# Patient Record
Sex: FEMALE | Race: OTHER | HISPANIC OR LATINO | Employment: UNEMPLOYED | URBAN - METROPOLITAN AREA
[De-identification: names, ages, dates, MRNs, and addresses within clinical notes are randomized per-mention and may not be internally consistent; named-entity substitution may affect disease eponyms.]

---

## 2017-05-08 ENCOUNTER — APPOINTMENT (OUTPATIENT)
Dept: LAB | Facility: CLINIC | Age: 37
End: 2017-05-08
Payer: COMMERCIAL

## 2017-05-08 ENCOUNTER — TRANSCRIBE ORDERS (OUTPATIENT)
Dept: LAB | Facility: CLINIC | Age: 37
End: 2017-05-08

## 2017-05-08 DIAGNOSIS — E78.2 MIXED HYPERLIPIDEMIA: ICD-10-CM

## 2017-05-08 DIAGNOSIS — E55.9 UNSPECIFIED VITAMIN D DEFICIENCY: Primary | ICD-10-CM

## 2017-05-08 DIAGNOSIS — E55.9 UNSPECIFIED VITAMIN D DEFICIENCY: ICD-10-CM

## 2017-05-08 LAB
25(OH)D3 SERPL-MCNC: 26.7 NG/ML (ref 30–100)
ALBUMIN SERPL BCP-MCNC: 3.7 G/DL (ref 3.5–5)
ALP SERPL-CCNC: 93 U/L (ref 46–116)
ALT SERPL W P-5'-P-CCNC: 24 U/L (ref 12–78)
ANION GAP SERPL CALCULATED.3IONS-SCNC: 6 MMOL/L (ref 4–13)
AST SERPL W P-5'-P-CCNC: 15 U/L (ref 5–45)
BILIRUB SERPL-MCNC: 0.4 MG/DL (ref 0.2–1)
BUN SERPL-MCNC: 11 MG/DL (ref 5–25)
CALCIUM SERPL-MCNC: 9 MG/DL (ref 8.3–10.1)
CHLORIDE SERPL-SCNC: 103 MMOL/L (ref 100–108)
CHOLEST SERPL-MCNC: 226 MG/DL (ref 50–200)
CO2 SERPL-SCNC: 28 MMOL/L (ref 21–32)
CREAT SERPL-MCNC: 0.76 MG/DL (ref 0.6–1.3)
GFR SERPL CREATININE-BSD FRML MDRD: >60 ML/MIN/1.73SQ M
GLUCOSE P FAST SERPL-MCNC: 83 MG/DL (ref 65–99)
HDLC SERPL-MCNC: 53 MG/DL (ref 40–60)
LDLC SERPL CALC-MCNC: 156 MG/DL (ref 0–100)
POTASSIUM SERPL-SCNC: 4 MMOL/L (ref 3.5–5.3)
PROT SERPL-MCNC: 7.5 G/DL (ref 6.4–8.2)
SODIUM SERPL-SCNC: 137 MMOL/L (ref 136–145)
TRIGL SERPL-MCNC: 83 MG/DL

## 2017-05-08 PROCEDURE — 80061 LIPID PANEL: CPT

## 2017-05-08 PROCEDURE — 82306 VITAMIN D 25 HYDROXY: CPT

## 2017-05-08 PROCEDURE — 36415 COLL VENOUS BLD VENIPUNCTURE: CPT

## 2017-05-08 PROCEDURE — 80053 COMPREHEN METABOLIC PANEL: CPT

## 2017-07-31 ENCOUNTER — TRANSCRIBE ORDERS (OUTPATIENT)
Dept: LAB | Facility: CLINIC | Age: 37
End: 2017-07-31

## 2017-07-31 ENCOUNTER — APPOINTMENT (OUTPATIENT)
Dept: LAB | Facility: CLINIC | Age: 37
End: 2017-07-31
Payer: COMMERCIAL

## 2017-07-31 DIAGNOSIS — K90.2 POSTOPERATIVE BLIND LOOP SYNDROME: Primary | ICD-10-CM

## 2017-07-31 DIAGNOSIS — K90.2 POSTOPERATIVE BLIND LOOP SYNDROME: ICD-10-CM

## 2017-07-31 LAB
25(OH)D3 SERPL-MCNC: 23.2 NG/ML (ref 30–100)
ALBUMIN SERPL BCP-MCNC: 3.7 G/DL (ref 3.5–5)
ALP SERPL-CCNC: 82 U/L (ref 46–116)
ALT SERPL W P-5'-P-CCNC: 25 U/L (ref 12–78)
ANION GAP SERPL CALCULATED.3IONS-SCNC: 9 MMOL/L (ref 4–13)
AST SERPL W P-5'-P-CCNC: 15 U/L (ref 5–45)
BASOPHILS # BLD AUTO: 0.03 THOUSANDS/ΜL (ref 0–0.1)
BASOPHILS NFR BLD AUTO: 1 % (ref 0–1)
BILIRUB SERPL-MCNC: 0.5 MG/DL (ref 0.2–1)
BUN SERPL-MCNC: 14 MG/DL (ref 5–25)
CALCIUM SERPL-MCNC: 8.9 MG/DL (ref 8.3–10.1)
CHLORIDE SERPL-SCNC: 103 MMOL/L (ref 100–108)
CO2 SERPL-SCNC: 26 MMOL/L (ref 21–32)
CREAT SERPL-MCNC: 0.7 MG/DL (ref 0.6–1.3)
EOSINOPHIL # BLD AUTO: 0.04 THOUSAND/ΜL (ref 0–0.61)
EOSINOPHIL NFR BLD AUTO: 1 % (ref 0–6)
ERYTHROCYTE [DISTWIDTH] IN BLOOD BY AUTOMATED COUNT: 13.2 % (ref 11.6–15.1)
FOLATE SERPL-MCNC: 9.2 NG/ML (ref 3.1–17.5)
GFR SERPL CREATININE-BSD FRML MDRD: 112 ML/MIN/1.73SQ M
GLUCOSE P FAST SERPL-MCNC: 81 MG/DL (ref 65–99)
HCT VFR BLD AUTO: 41 % (ref 34.8–46.1)
HGB BLD-MCNC: 13.7 G/DL (ref 11.5–15.4)
IRON SERPL-MCNC: 140 UG/DL (ref 50–170)
LYMPHOCYTES # BLD AUTO: 1.65 THOUSANDS/ΜL (ref 0.6–4.47)
LYMPHOCYTES NFR BLD AUTO: 31 % (ref 14–44)
MCH RBC QN AUTO: 27.7 PG (ref 26.8–34.3)
MCHC RBC AUTO-ENTMCNC: 33.4 G/DL (ref 31.4–37.4)
MCV RBC AUTO: 83 FL (ref 82–98)
MONOCYTES # BLD AUTO: 0.42 THOUSAND/ΜL (ref 0.17–1.22)
MONOCYTES NFR BLD AUTO: 8 % (ref 4–12)
NEUTROPHILS # BLD AUTO: 3.19 THOUSANDS/ΜL (ref 1.85–7.62)
NEUTS SEG NFR BLD AUTO: 59 % (ref 43–75)
PLATELET # BLD AUTO: 233 THOUSANDS/UL (ref 149–390)
PMV BLD AUTO: 9.6 FL (ref 8.9–12.7)
POTASSIUM SERPL-SCNC: 3.7 MMOL/L (ref 3.5–5.3)
PROT SERPL-MCNC: 7.8 G/DL (ref 6.4–8.2)
RBC # BLD AUTO: 4.95 MILLION/UL (ref 3.81–5.12)
SODIUM SERPL-SCNC: 138 MMOL/L (ref 136–145)
VIT B12 SERPL-MCNC: 939 PG/ML (ref 100–900)
WBC # BLD AUTO: 5.33 THOUSAND/UL (ref 4.31–10.16)

## 2017-07-31 PROCEDURE — 82607 VITAMIN B-12: CPT

## 2017-07-31 PROCEDURE — 82306 VITAMIN D 25 HYDROXY: CPT

## 2017-07-31 PROCEDURE — 85025 COMPLETE CBC W/AUTO DIFF WBC: CPT

## 2017-07-31 PROCEDURE — 83540 ASSAY OF IRON: CPT

## 2017-07-31 PROCEDURE — 84425 ASSAY OF VITAMIN B-1: CPT

## 2017-07-31 PROCEDURE — 82746 ASSAY OF FOLIC ACID SERUM: CPT

## 2017-07-31 PROCEDURE — 84590 ASSAY OF VITAMIN A: CPT

## 2017-07-31 PROCEDURE — 80053 COMPREHEN METABOLIC PANEL: CPT

## 2017-07-31 PROCEDURE — 36415 COLL VENOUS BLD VENIPUNCTURE: CPT

## 2017-08-03 LAB — VIT A SERPL-MCNC: 31 UG/DL (ref 20–65)

## 2017-08-04 LAB — VIT B1 BLD-SCNC: 113.3 NMOL/L (ref 66.5–200)

## 2018-01-09 NOTE — MISCELLANEOUS
Message  Pt here with her daughter  She was given Rx for Lt breast Dx MMG, has appt tomorrow at [facility]        Signatures   Electronically signed by : PATEL Whittington Ra; Sep 22 2016  2:56PM EST                       (Author)

## 2018-01-13 NOTE — RESULT NOTES
Message    order in [employer] for Lt breast Dx and possible US         Verified Results  * Sean Lambert CAD 20Sep2016 07:58AM Jen Shah Order Number: [id number]    - Patient Instructions: To schedule this appointment, please contact Central Scheduling at [phone]  Do not wear any perfume, powder, lotion or deodorant on breast or underarm area  Please bring your doctors order, referral (if needed) and insurance information with you on the day of the test  Failure to bring this information may result in this test being rescheduled  Arrive 15 minutes prior to your appointment time to register  On the day of your test, please bring any prior mammogram or breast studies with you that were not performed at a [facility]  Failure to bring prior exams may result in your test needing to be rescheduled   Order Number: [id number]    - Patient Instructions: To schedule this appointment, please contact Central Scheduling at [phone]  Do not wear any perfume, powder, lotion or deodorant on breast or underarm area  Please bring your doctors order, referral (if needed) and insurance information with you on the day of the test  Failure to bring this information may result in this test being rescheduled  Arrive 15 minutes prior to your appointment time to register  On the day of your test, please bring any prior mammogram or breast studies with you that were not performed at a [facility]  Failure to bring prior exams may result in your test needing to be rescheduled  Test Name Result Flag Reference   MAMMO SCREENING BILATERAL W CAD (Report)     Patient History:   Family history of breast cancer in mother at age 39  Patient has never smoked  Patient's BMI is 32 7  Reason for exam: screening (asymptomatic)     Screening     Mammo Screening Bilateral W CAD: September 20, 2016 - Check In #:   [de-identified]   Bilateral CC and MLO view(s) were taken  Technologist: JUANJOSE Oconnell (JUANJOSE)(M)   Prior study comparison: March 25, 2015, left breast diagnostic    mammogram, performed at [facility]  There are scattered fibroglandular densities  There is a 1 1 cm    nodular asymmetry in the medial left breast, 4 cm from the    nipple, identified with certainty on the craniocaudal view only  This is not significantly changed from the prior study  However, it is an asymmetric finding when compared to the    opposite breast  In light of the patient's strong family history   of breast cancer, I would recommend the patient return for    lateral and spot compression views, possible rolled craniocaudal    views and possible ultrasound, to exclude pathology  Scattered benign-appearing calcifications are identified  No    dominant soft tissue mass, architectural distortion or suspicious   calcifications are noted in either breast  The skin and nipple    contours are within normal limits  1  Medial left breast 1 1 cm nodular asymmetry, probable    summation artifact  However, in light of the patient's strong    family history of breast cancer, I would recommend additional    imaging to exclude a nodule  2  Negative right breast mammogram     ASSESSMENT: BiRad:0 - Incomplete: needs additional imaging    evaluation - Left     Recommendation:   Further imaging of the left breast    A breast health care nurse from our facility will be contacting    the patient regarding the need for additional imaging  Analyzed by CAD     8-10% of cancers will be missed on mammography  Management of a    palpable abnormality must be based on clinical grounds  Patients   will be notified of their results via letter from our facility  Accredited by [employer] of [employer] and FDA       Transcription Location: JUANJOSE Diaz 98: [id number]     Risk Value(s):   Damoner-Cuolga lidiack 10 Year: 1 272%, Tyrer-Cubret Lifetime: 16 836%,    Myriad Table: 2 6%, СВЕТЛАНА 5 Year: 0 4%, NCI Lifetime: 14 7%   Signed by:   Kody Carlisle MD   9/20/16       Plan  Breast nodule    · MAMMO DIAGNOSTIC LEFT W CAD; Status:Hold For - Scheduling; Requested  [id number];     Signatures   Electronically signed by : PATEL Wild; Sep 20 2016 12:06PM EST                       (Author)

## 2018-01-18 NOTE — RESULT NOTES
Message   Reviewed results with pt today at her daughter's appt  Verified Results  MAMMO DIAGNOSTIC LEFT W CAD [id number] 12:04PM Talya Magallanes Order Number: [id number]   Performing Comments: Lt breast US if needed  - Patient Instructions: To schedule this appointment, please contact Central Scheduling at [phone]  Do not wear any perfume, powder, lotion or deodorant on breast or underarm area  Please bring your doctors order, referral (if needed) and insurance information with you on the day of the test  Failure to bring this information may result in this test being rescheduled  Arrive 15 minutes prior to your appointment time to register  On the day of your test, please bring any prior mammogram or breast studies with you that were not performed at a [facility]  Failure to bring prior exams may result in your test needing to be rescheduled   Order Number: [id number]   Performing Comments: Lt breast US if needed  - Patient Instructions: To schedule this appointment, please contact Central Scheduling at [phone]  Do not wear any perfume, powder, lotion or deodorant on breast or underarm area  Please bring your doctors order, referral (if needed) and insurance information with you on the day of the test  Failure to bring this information may result in this test being rescheduled  Arrive 15 minutes prior to your appointment time to register  On the day of your test, please bring any prior mammogram or breast studies with you that were not performed at a [facility]  Failure to bring prior exams may result in your test needing to be rescheduled  Test Name Result Flag Reference   MAMMO DIAGNOSTIC LEFT W CAD (Report)     Patient History:   Family history of breast cancer in mother at age 39  Patient has never smoked  Patient's BMI is 32 7  Reason for exam: additional evaluation requested from abnormal    screening     Further assessment of left breast asymmetry on screening    mammogram dated 9/20/2016  Family history of breast cancer in    mother at age 39  Mammo Diagnostic Left W CAD: September 23, 2016 - Check In #:    [de-identified]   Spot compression CC, spot compression MLO, and ML view(s) were    taken of the left breast      Technologist: JUANJOSE Canales (R)(M)   Prior study comparison: September 20, 2016, mammo screening    bilateral W CAD, performed at [facility] 22 March 25, 2015, left breast diagnostic mammogram,    performed at [place] [address]  There are scattered fibroglandular densities  Spot compression    imaging and straight lateral view of the left breast demonstrate    no evidence of persistent mass or architectural distortion  The    density seen on the screening study is less conspicuous on the    diagnostic exam  No malignant calcifications are seen  The skin   and nipple contour appear normal  No axillary adenopathy    identified  An ultrasound of the left breast was performed targeted to the    area of the original mammographic finding  Images were obtained    from 6:00 to 9:00  No solid or shadowing masses are seen  At    the 9:00 location, 3 cm from nipple, incidental 3 mm cyst was    noted  No suspicious findings     US Breast Left Limited: September 23, 2016 - Check In #: [de-identified]   Technologist: Olaf Estrada   See above     IMPRESSION: See above     ASSESSMENT: BiRad:2 - Benign (Overall)     Recommendation:   Routine screening mammogram of both breasts in 1 year     Analyzed by CAD     Transcription Location: [address]   Signing Station: [id number]     Risk Value(s):   Tyrer-Cuzick 10 Year: 1 270%, Tyrer-Cuzick Lifetime: 16 819%,    Myriad Table: 2 6%, СВЕТЛАНА 5 Year: 0 4%, NCI Lifetime: 14 7%   Signed by:   Madeleine Johns MD   9/23/16